# Patient Record
Sex: MALE | Race: WHITE | NOT HISPANIC OR LATINO | Employment: OTHER | URBAN - METROPOLITAN AREA
[De-identification: names, ages, dates, MRNs, and addresses within clinical notes are randomized per-mention and may not be internally consistent; named-entity substitution may affect disease eponyms.]

---

## 2024-05-15 ENCOUNTER — APPOINTMENT (OUTPATIENT)
Dept: RADIOLOGY | Facility: MEDICAL CENTER | Age: 79
End: 2024-05-15
Attending: EMERGENCY MEDICINE
Payer: MEDICARE

## 2024-05-15 ENCOUNTER — HOSPITAL ENCOUNTER (EMERGENCY)
Facility: MEDICAL CENTER | Age: 79
End: 2024-05-15
Attending: EMERGENCY MEDICINE
Payer: MEDICARE

## 2024-05-15 VITALS
HEIGHT: 72 IN | WEIGHT: 218.26 LBS | TEMPERATURE: 98.8 F | DIASTOLIC BLOOD PRESSURE: 82 MMHG | BODY MASS INDEX: 29.56 KG/M2 | HEART RATE: 91 BPM | SYSTOLIC BLOOD PRESSURE: 140 MMHG | OXYGEN SATURATION: 97 % | RESPIRATION RATE: 17 BRPM

## 2024-05-15 DIAGNOSIS — R41.0 DELIRIUM: ICD-10-CM

## 2024-05-15 LAB
ALBUMIN SERPL BCP-MCNC: 3.6 G/DL (ref 3.2–4.9)
ALBUMIN/GLOB SERPL: 1.3 G/DL
ALP SERPL-CCNC: 204 U/L (ref 30–99)
ALT SERPL-CCNC: 28 U/L (ref 2–50)
AMPHET UR QL SCN: NEGATIVE
ANION GAP SERPL CALC-SCNC: 15 MMOL/L (ref 7–16)
APPEARANCE UR: CLEAR
AST SERPL-CCNC: 46 U/L (ref 12–45)
BARBITURATES UR QL SCN: NEGATIVE
BASOPHILS # BLD AUTO: 0.2 % (ref 0–1.8)
BASOPHILS # BLD: 0.01 K/UL (ref 0–0.12)
BENZODIAZ UR QL SCN: NEGATIVE
BILIRUB SERPL-MCNC: 0.3 MG/DL (ref 0.1–1.5)
BILIRUB UR QL STRIP.AUTO: NEGATIVE
BUN SERPL-MCNC: 20 MG/DL (ref 8–22)
BZE UR QL SCN: NEGATIVE
CALCIUM ALBUM COR SERPL-MCNC: 8.9 MG/DL (ref 8.5–10.5)
CALCIUM SERPL-MCNC: 8.6 MG/DL (ref 8.4–10.2)
CANNABINOIDS UR QL SCN: NEGATIVE
CHLORIDE SERPL-SCNC: 100 MMOL/L (ref 96–112)
CO2 SERPL-SCNC: 20 MMOL/L (ref 20–33)
COLOR UR: YELLOW
CREAT SERPL-MCNC: 1.13 MG/DL (ref 0.5–1.4)
EOSINOPHIL # BLD AUTO: 0 K/UL (ref 0–0.51)
EOSINOPHIL NFR BLD: 0 % (ref 0–6.9)
ERYTHROCYTE [DISTWIDTH] IN BLOOD BY AUTOMATED COUNT: 42.2 FL (ref 35.9–50)
ETHANOL BLD-MCNC: <10.1 MG/DL
FENTANYL UR QL: NEGATIVE
GFR SERPLBLD CREATININE-BSD FMLA CKD-EPI: 67 ML/MIN/1.73 M 2
GLOBULIN SER CALC-MCNC: 2.7 G/DL (ref 1.9–3.5)
GLUCOSE BLD STRIP.AUTO-MCNC: 132 MG/DL (ref 65–99)
GLUCOSE SERPL-MCNC: 115 MG/DL (ref 65–99)
GLUCOSE UR STRIP.AUTO-MCNC: NEGATIVE MG/DL
HCT VFR BLD AUTO: 39.2 % (ref 42–52)
HGB BLD-MCNC: 13.6 G/DL (ref 14–18)
IMM GRANULOCYTES # BLD AUTO: 0.04 K/UL (ref 0–0.11)
IMM GRANULOCYTES NFR BLD AUTO: 0.6 % (ref 0–0.9)
KETONES UR STRIP.AUTO-MCNC: ABNORMAL MG/DL
LEUKOCYTE ESTERASE UR QL STRIP.AUTO: NEGATIVE
LYMPHOCYTES # BLD AUTO: 0.8 K/UL (ref 1–4.8)
LYMPHOCYTES NFR BLD: 12.2 % (ref 22–41)
MCH RBC QN AUTO: 29.9 PG (ref 27–33)
MCHC RBC AUTO-ENTMCNC: 34.7 G/DL (ref 32.3–36.5)
MCV RBC AUTO: 86.2 FL (ref 81.4–97.8)
METHADONE UR QL SCN: NEGATIVE
MICRO URNS: ABNORMAL
MONOCYTES # BLD AUTO: 0.44 K/UL (ref 0–0.85)
MONOCYTES NFR BLD AUTO: 6.7 % (ref 0–13.4)
NEUTROPHILS # BLD AUTO: 5.29 K/UL (ref 1.82–7.42)
NEUTROPHILS NFR BLD: 80.3 % (ref 44–72)
NITRITE UR QL STRIP.AUTO: NEGATIVE
NRBC # BLD AUTO: 0 K/UL
NRBC BLD-RTO: 0 /100 WBC (ref 0–0.2)
OPIATES UR QL SCN: NEGATIVE
OXYCODONE UR QL SCN: NEGATIVE
PCP UR QL SCN: NEGATIVE
PH UR STRIP.AUTO: 5.5 [PH] (ref 5–8)
PLATELET # BLD AUTO: 293 K/UL (ref 164–446)
PMV BLD AUTO: 10 FL (ref 9–12.9)
POTASSIUM SERPL-SCNC: 4.5 MMOL/L (ref 3.6–5.5)
PROPOXYPH UR QL SCN: NEGATIVE
PROT SERPL-MCNC: 6.3 G/DL (ref 6–8.2)
PROT UR QL STRIP: NEGATIVE MG/DL
RBC # BLD AUTO: 4.55 M/UL (ref 4.7–6.1)
RBC UR QL AUTO: NEGATIVE
SODIUM SERPL-SCNC: 135 MMOL/L (ref 135–145)
SP GR UR STRIP.AUTO: >=1.03
WBC # BLD AUTO: 6.6 K/UL (ref 4.8–10.8)

## 2024-05-15 PROCEDURE — 700117 HCHG RX CONTRAST REV CODE 255: Performed by: EMERGENCY MEDICINE

## 2024-05-15 PROCEDURE — 70553 MRI BRAIN STEM W/O & W/DYE: CPT

## 2024-05-15 PROCEDURE — 80307 DRUG TEST PRSMV CHEM ANLYZR: CPT

## 2024-05-15 PROCEDURE — 71045 X-RAY EXAM CHEST 1 VIEW: CPT

## 2024-05-15 PROCEDURE — 82077 ASSAY SPEC XCP UR&BREATH IA: CPT

## 2024-05-15 PROCEDURE — 81003 URINALYSIS AUTO W/O SCOPE: CPT | Mod: XU

## 2024-05-15 PROCEDURE — 87040 BLOOD CULTURE FOR BACTERIA: CPT

## 2024-05-15 PROCEDURE — 82962 GLUCOSE BLOOD TEST: CPT

## 2024-05-15 PROCEDURE — A9579 GAD-BASE MR CONTRAST NOS,1ML: HCPCS | Performed by: EMERGENCY MEDICINE

## 2024-05-15 PROCEDURE — 99284 EMERGENCY DEPT VISIT MOD MDM: CPT

## 2024-05-15 PROCEDURE — 85025 COMPLETE CBC W/AUTO DIFF WBC: CPT

## 2024-05-15 PROCEDURE — 80053 COMPREHEN METABOLIC PANEL: CPT

## 2024-05-15 PROCEDURE — 36415 COLL VENOUS BLD VENIPUNCTURE: CPT

## 2024-05-15 RX ORDER — DENOSUMAB 120 MG/1.7ML
120 INJECTION SUBCUTANEOUS
COMMUNITY

## 2024-05-15 RX ORDER — NAPROXEN SODIUM 220 MG
440 TABLET ORAL 2 TIMES DAILY PRN
COMMUNITY

## 2024-05-15 RX ADMIN — GADOTERIDOL 20 ML: 279.3 INJECTION, SOLUTION INTRAVENOUS at 19:16

## 2024-05-15 NOTE — ED PROVIDER NOTES
"ER Provider Note    Scribed for Alexys Washington M.d. by Anand Rothman. 5/15/2024  3:42 PM    Primary Care Provider: No primary care provider noted.    CHIEF COMPLAINT   Chief Complaint   Patient presents with    ALOC     Pt states he hallucinated last night. Staying in hotel were on the 3rd floor. Pt went outside in his underwear looking for place to urinate and got locked out. Pt appears alert and oriented in triage. No obv neuro deficits. Pt wife states he is still a little confused and wandering. Pt does not have any unilateral weakness or slurred speech. Pt denies dysuria, does endorse productive cough with yellow sputum.      EXTERNAL RECORDS REVIEWED  The patient's records show that he was previously seen by oncology in [place] and then stopped traditional treatment to seek alternative medicine.     HPI/ROS  LIMITATION TO HISTORY   Select: : None  OUTSIDE HISTORIAN(S):  Significant other the patient's wife was present at bedside to provide additional context to history.    Randal Fine is a 78 y.o. male who presents to the ED complaining of ALOC onset last night. 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 He reports that he experienced some \"scary\" hallucinations last night which prompted visitation to the ED. The patient explains that he awoke last night standing outside of a building in his underwear looking for a place to urinate. He adds that he was locked out of the building and had to wonder around until someone came in and let him in. The patient reports that the building was actually his hotel that he was staying in. The patient returned to his room and then went to bed without any further complications. The patient's wife adds that the patient was seeing things on the ceiling before he went to bed and experiencing other hallucinations. Denies a fever, chills, vomiting, dysuria, or burning with " "urination. He reports that he had nausea on Sunday and a few other times in the past few weeks. He denies having any claustrophobia. The patient reports that he had a pick line put into place and was treated with Coumarin on Monday of this week. He adds that he had a round of chemotherapy yesterday and was treated with hydrogen peroxide today. The patient notes that he will then have chemotherapy tomorrow and receive additional treatment on Friday. He explains that he will then repeat this same cycle for 2 more weeks before leaving [place] and returning home. He explains that he has been advised to receive an MRI of his brain and notes that he has history of brain MRI's with no masses present. The patient denies drinking any alcohol for the last few weeks but notes that he has a history of having 2 drinks a day.    The patient reports that he has also been having benign paroxysmal positional vertigo when getting out of bed for the past few months. He explains that he went to a rehabilitation site for treatment which did not alleviate his symptoms.     The patient also complains of redness around his eyes onset Friday of last week. He reports that he was in [place] for a week prior to these symptoms occurring. The patient is unsure if he had a sunburn prior to his symptoms beginning. The patient's wife explains that her husbands symptoms seem to be getting worse over time. The rash is described as scaly and peeling with no pain.     The patient also notes that he has been having a \"focus issue\" with his eyes. He explains that he has recently been experiencing vision blurriness at a distance which is abnormal for him. The patient notes that he currently has a cataract being worked on and has an appointment with an optometrist in June.     PAST MEDICAL HISTORY  Past Medical History:   Diagnosis Date    A-fib (HCC)     Hypertension      SURGICAL HISTORY  History reviewed. No pertinent surgical history.    FAMILY " HISTORY  History reviewed. No pertinent family history.    SOCIAL HISTORY   reports that he has never smoked. He has never used smokeless tobacco. He reports current alcohol use. He reports that he does not use drugs.    ALLERGIES  Patient has no known allergies.    PHYSICAL EXAM  BP (!) 138/99   Pulse 87   Temp 37.6 °C (99.7 °F) (Temporal)   Resp 18   Ht 1.829 m (6')   Wt 99 kg (218 lb 4.1 oz)   SpO2 95%   BMI 29.60 kg/m²     Constitutional: Well developed, Well nourished, Mild distress.   HENT:  Atraumatic, Erythema and flaky scaly skin around both eyes that is slightly warm to the touch and symmetrical.   Eyes: Conjunctiva normal, No discharge.   Cardiovascular: Normal heart rate, Normal rhythm, No murmurs, equal pulses.   Pulmonary: Normal breath sounds, No respiratory distress, No wheezing, No rales, No rhonchi.  Chest: No chest wall tenderness or deformity.   Abdomen:Soft, No tenderness, No masses, no rebound, no guarding.   Musculoskeletal: No major deformities noted, No tenderness.   Skin: Warm, Dry, No erythema, No rash.   Neurologic: Alert & oriented x 3, Normal motor function,  No focal deficits noted.   Psychiatric: Affect normal, Judgment normal, Mood normal.     DIAGNOSTIC STUDIES    EKG/LABS  Results for orders placed or performed during the hospital encounter of 05/15/24   CBC with Differential   Result Value Ref Range    WBC 6.6 4.8 - 10.8 K/uL    RBC 4.55 (L) 4.70 - 6.10 M/uL    Hemoglobin 13.6 (L) 14.0 - 18.0 g/dL    Hematocrit 39.2 (L) 42.0 - 52.0 %    MCV 86.2 81.4 - 97.8 fL    MCH 29.9 27.0 - 33.0 pg    MCHC 34.7 32.3 - 36.5 g/dL    RDW 42.2 35.9 - 50.0 fL    Platelet Count 293 164 - 446 K/uL    MPV 10.0 9.0 - 12.9 fL    Neutrophils-Polys 80.30 (H) 44.00 - 72.00 %    Lymphocytes 12.20 (L) 22.00 - 41.00 %    Monocytes 6.70 0.00 - 13.40 %    Eosinophils 0.00 0.00 - 6.90 %    Basophils 0.20 0.00 - 1.80 %    Immature Granulocytes 0.60 0.00 - 0.90 %    Nucleated RBC 0.00 0.00 - 0.20 /100 WBC     Neutrophils (Absolute) 5.29 1.82 - 7.42 K/uL    Lymphs (Absolute) 0.80 (L) 1.00 - 4.80 K/uL    Monos (Absolute) 0.44 0.00 - 0.85 K/uL    Eos (Absolute) 0.00 0.00 - 0.51 K/uL    Baso (Absolute) 0.01 0.00 - 0.12 K/uL    Immature Granulocytes (abs) 0.04 0.00 - 0.11 K/uL    NRBC (Absolute) 0.00 K/uL   Comp Metabolic Panel   Result Value Ref Range    Sodium 135 135 - 145 mmol/L    Potassium 4.5 3.6 - 5.5 mmol/L    Chloride 100 96 - 112 mmol/L    Co2 20 20 - 33 mmol/L    Anion Gap 15.0 7.0 - 16.0    Glucose 115 (H) 65 - 99 mg/dL    Bun 20 8 - 22 mg/dL    Creatinine 1.13 0.50 - 1.40 mg/dL    Calcium 8.6 8.4 - 10.2 mg/dL    Correct Calcium 8.9 8.5 - 10.5 mg/dL    AST(SGOT) 46 (H) 12 - 45 U/L    ALT(SGPT) 28 2 - 50 U/L    Alkaline Phosphatase 204 (H) 30 - 99 U/L    Total Bilirubin 0.3 0.1 - 1.5 mg/dL    Albumin 3.6 3.2 - 4.9 g/dL    Total Protein 6.3 6.0 - 8.2 g/dL    Globulin 2.7 1.9 - 3.5 g/dL    A-G Ratio 1.3 g/dL   Urinalysis    Specimen: Urine   Result Value Ref Range    Color Yellow     Character Clear     Specific Gravity >=1.030 <1.035    Ph 5.5 5.0 - 8.0    Glucose Negative Negative mg/dL    Ketones Trace (A) Negative mg/dL    Protein Negative Negative mg/dL    Bilirubin Negative Negative    Nitrite Negative Negative    Leukocyte Esterase Negative Negative    Occult Blood Negative Negative    Micro Urine Req see below    Urine Drug Screen (Triage)   Result Value Ref Range    Amphetamines Urine Negative Negative    Barbiturates Negative Negative    Benzodiazepines Negative Negative    Cocaine Metabolite Negative Negative    Fentanyl, Urine Negative Negative    Methadone Negative Negative    Opiates Negative Negative    Oxycodone Negative Negative    Phencyclidine -Pcp Negative Negative    Propoxyphene Negative Negative    Cannabinoid Metab Negative Negative   ETHYL ALCOHOL (BLOOD)   Result Value Ref Range    Diagnostic Alcohol <10.1 <10.1 mg/dL   ESTIMATED GFR   Result Value Ref Range    GFR (CKD-EPI) 67 >60  mL/min/1.73 m 2   POCT glucose device results   Result Value Ref Range    POC Glucose, Blood 132 (H) 65 - 99 mg/dL     RADIOLOGY/PROCEDURES   Radiologist interpretation:  MR-BRAIN-WITH & W/O   Final Result      1.  No evidence of acute ischemia, hemorrhage or mass   2.  Small area of encephalomalacia in the LEFT cerebellum   3.  Atrophy   4.  White matter changes      DX-CHEST-PORTABLE (1 VIEW)   Final Result         Patchy right basilar opacities, atelectasis or infection.        COURSE & MEDICAL DECISION MAKING     ASSESSMENT, COURSE AND PLAN  Care Narrative:     3:47 PM - I consulted with radiology who recommends that the patient receive an MRI. Ordered for DX-Chest and MR-Brain w/ and w/Out, Ethyl Alcohol, UA, Urine Drug Screen, Diagnostic Alcohol, CMP, CBC w/Diff, and POCT Glucose to evaluate.    3:50 PM - Patient seen and examined at bedside. Discussed plan of care, including imaging and lab work. Patient agrees to the plan of care. Ordered for Blood Culture X2 to evaluate. Differential diagnoses include but not limited to: Intercranial mass versus Infectious process Versus Chemotherapy reaction    5:12 PM - Patient was reevaluated at bedside. The patient informed me that he believes his immunotherapy may have led to his current symptoms. He notes that his last immunotherapy treatment was April 3rd but explained that side effects can last for a few months and impact the eyes. Will review the patient's chart.     7:15 PM - The patient was medicated with Prohance 20 mL injection.    7:57 PM - Patient was reevaluated at bedside. Discussed diagnostic studies which showed no brain masses but pointed to a previous stroke. Explained that I am unsure why the patient had a hallucination but that the patient is currently A and O x3 and can be discharged. I discussed plan for discharge and follow up as outlined below. The patient is stable for discharge at this time and will return for any new or worsening symptoms.  Patient verbalizes understanding and support with my plan for discharge.     ADDITIONAL PROBLEM LIST  Problem 1 altered mental status at this point time it sounds like the patient may have had some delirium unclear exactly the etiology of this MRI was done and does not show any signs of intracranial hemorrhage or mass.  Patient does not show any signs of infectious process.  It may have been just because he was disoriented from being in a new environment.  At this point time patient is back to his baseline I think he can be discharged home.    Problem #2 rash patient has a symmetric rash around both eyes.  It is not significantly warm to touch I do not think this is a cellulitis.  Is unclear if this is a reaction to the patient's previous immune modulators.  I will have the patient follow-up with his regular doctor for this.    DISPOSITION AND DISCUSSIONS  I have discussed management of the patient with the following physicians and ANA CRISTINA's:  None    Discussion of management with other Q or appropriate source(s): Radiologist who recommended MRI with and without      Escalation of care considered, and ultimately not performed: acute inpatient care management, however at this time, the patient is most appropriate for outpatient management.    Barriers to care at this time, including but not limited to: Patient does not have established PCP.       The patient will return for new or worsening symptoms and is stable at the time of discharge.    DISPOSITION:  Patient will be discharged home in stable condition.    FOLLOW UP:  Bobby Robert M.D.  [address] [place] [id number]  [phone]    In 1 day      FINAL DIAGNOSIS  1. Delirium       Anand MASTERSON (Scribe), am scribing for, and in the presence of, GUILLERMO Fatima*.    Electronically signed by: Anand Rothman (Scribe), 5/15/2024    Alexys MASTERSON M.* personally performed the services described in this documentation, as scribed by Anand Rothman in my  presence, and it is both accurate and complete.     The note accurately reflects work and decisions made by me.  Alexys Washington M.D.  5/15/2024  11:11 PM

## 2024-05-15 NOTE — ED TRIAGE NOTES
BIB spouse for following complaints.     Chief Complaint   Patient presents with    ALOC     Pt states he hallucinated last night. Staying in hotel were on the 3rd floor. Pt went outside in his underwear looking for place to urinate and got locked out. Pt appears alert and oriented in triage. No obv neuro deficits. Pt wife states he is still a little confused and wandering. Pt does not have any unilateral weakness or slurred speech. Pt denies dysuria, does endorse productive cough with yellow sputum.        BP (!) 138/99   Pulse 87   Temp 37.6 °C (99.7 °F) (Temporal)   Resp 18   Ht 1.829 m (6')   Wt 99 kg (218 lb 4.1 oz)   SpO2 95%   BMI 29.60 kg/m²

## 2024-05-15 NOTE — ED NOTES
Medication history reviewed with pt. Med rec is complete.  Allergies reviewed, per pt  Interviewed pt with wife at bedside with permission from pt.    Pt reports that he receives his chemo treatments from Dr. Robert ([phone]), pt just started his first treatment on 5/13/2024.  Per pt gets vitamins on Mon, Wed, and Friday and his chemo treatment on Tue and Thur.   Pt reports that he hopes to be done with his treatments on 6/1/2024    Pt reports that he has not taken FAMOTIDINE 20MG, MONTELUKAST 10MG, VALACYCLOVIR 1GM, ZYRTEC 10MG, HYDROXYZINE 10MG, or   DEXAMETHASONE 2MG in the last 30 days or longer.    Per pt takes more vitamins, can't name them all at this time.    Patient has not had any outpatient antibiotics in the last 30 days.    Pt is on anticoagulants, pt takes ELIQUIS 5MG.  Last dose was taken today @ 0730

## 2024-05-15 NOTE — ED NOTES
Blood work including 1st set of blood cultures collected & sent to lab;    Pt given urinal to collect UA, Pt stated that he will let this RN know when sample provided; Pt denied having any other needs at this time, no distress noted;

## 2024-05-16 NOTE — ED NOTES
Pt and spouse both denied having any needs at this time, no distress noted;    Pt aware that he is waiting for MRI;

## 2024-05-16 NOTE — ED NOTES
2nd set of blood cultures collected & sent to lab, Pt denied having any needs at this time, no distress noted;    Pt aware that he is waiting for MRI results;

## 2024-05-16 NOTE — DISCHARGE INSTRUCTIONS
Return the emergency department if you have new confusion, unilateral weakness, difficulty speaking, or fever.

## 2024-05-20 LAB
BACTERIA BLD CULT: NORMAL
BACTERIA BLD CULT: NORMAL
SIGNIFICANT IND 70042: NORMAL
SIGNIFICANT IND 70042: NORMAL
SITE SITE: NORMAL
SITE SITE: NORMAL
SOURCE SOURCE: NORMAL
SOURCE SOURCE: NORMAL